# Patient Record
Sex: FEMALE | Race: WHITE | NOT HISPANIC OR LATINO | Employment: OTHER | URBAN - METROPOLITAN AREA
[De-identification: names, ages, dates, MRNs, and addresses within clinical notes are randomized per-mention and may not be internally consistent; named-entity substitution may affect disease eponyms.]

---

## 2017-06-14 ENCOUNTER — HOSPITAL ENCOUNTER (OUTPATIENT)
Dept: OTHER | Facility: HOSPITAL | Age: 72
Setting detail: SPECIMEN
Discharge: HOME | End: 2017-06-14
Attending: INTERNAL MEDICINE | Admitting: INTERNAL MEDICINE

## 2017-06-14 ENCOUNTER — OFFICE (AMBULATORY)
Dept: URBAN - METROPOLITAN AREA CLINIC 64 | Facility: CLINIC | Age: 72
End: 2017-06-14
Payer: OTHER GOVERNMENT

## 2017-06-14 ENCOUNTER — ON CAMPUS - OUTPATIENT (AMBULATORY)
Dept: URBAN - METROPOLITAN AREA HOSPITAL 2 | Facility: HOSPITAL | Age: 72
End: 2017-06-14
Payer: OTHER GOVERNMENT

## 2017-06-14 VITALS
HEIGHT: 65 IN | TEMPERATURE: 98 F | HEART RATE: 61 BPM | DIASTOLIC BLOOD PRESSURE: 68 MMHG | DIASTOLIC BLOOD PRESSURE: 47 MMHG | HEART RATE: 69 BPM | SYSTOLIC BLOOD PRESSURE: 96 MMHG | RESPIRATION RATE: 18 BRPM | RESPIRATION RATE: 16 BRPM | DIASTOLIC BLOOD PRESSURE: 44 MMHG | DIASTOLIC BLOOD PRESSURE: 53 MMHG | WEIGHT: 145.2 LBS | SYSTOLIC BLOOD PRESSURE: 103 MMHG | OXYGEN SATURATION: 96 % | HEART RATE: 66 BPM | SYSTOLIC BLOOD PRESSURE: 92 MMHG | OXYGEN SATURATION: 99 % | SYSTOLIC BLOOD PRESSURE: 113 MMHG | OXYGEN SATURATION: 98 % | SYSTOLIC BLOOD PRESSURE: 128 MMHG | HEART RATE: 68 BPM | DIASTOLIC BLOOD PRESSURE: 60 MMHG | DIASTOLIC BLOOD PRESSURE: 71 MMHG | OXYGEN SATURATION: 93 % | SYSTOLIC BLOOD PRESSURE: 91 MMHG | HEART RATE: 62 BPM | SYSTOLIC BLOOD PRESSURE: 102 MMHG | HEART RATE: 64 BPM | OXYGEN SATURATION: 97 % | DIASTOLIC BLOOD PRESSURE: 66 MMHG | SYSTOLIC BLOOD PRESSURE: 95 MMHG | DIASTOLIC BLOOD PRESSURE: 54 MMHG | DIASTOLIC BLOOD PRESSURE: 57 MMHG | HEART RATE: 67 BPM | SYSTOLIC BLOOD PRESSURE: 94 MMHG | OXYGEN SATURATION: 95 %

## 2017-06-14 DIAGNOSIS — K62.1 RECTAL POLYP: ICD-10-CM

## 2017-06-14 DIAGNOSIS — D12.3 BENIGN NEOPLASM OF TRANSVERSE COLON: ICD-10-CM

## 2017-06-14 DIAGNOSIS — D12.0 BENIGN NEOPLASM OF CECUM: ICD-10-CM

## 2017-06-14 DIAGNOSIS — K63.89 OTHER SPECIFIED DISEASES OF INTESTINE: ICD-10-CM

## 2017-06-14 DIAGNOSIS — D12.8 BENIGN NEOPLASM OF RECTUM: ICD-10-CM

## 2017-06-14 DIAGNOSIS — Z86.010 PERSONAL HISTORY OF COLONIC POLYPS: ICD-10-CM

## 2017-06-14 LAB
GI HISTOLOGY: A. UNSPECIFIED: (no result)
GI HISTOLOGY: B. UNSPECIFIED: (no result)
GI HISTOLOGY: C. UNSPECIFIED: (no result)
GI HISTOLOGY: PDF REPORT: (no result)

## 2017-06-14 PROCEDURE — 88305 TISSUE EXAM BY PATHOLOGIST: CPT | Mod: 26 | Performed by: INTERNAL MEDICINE

## 2017-06-14 PROCEDURE — 45385 COLONOSCOPY W/LESION REMOVAL: CPT | Mod: PT | Performed by: INTERNAL MEDICINE

## 2017-06-14 RX ADMIN — PROPOFOL: 10 INJECTION, EMULSION INTRAVENOUS at 11:08

## 2020-11-12 ENCOUNTER — OFFICE (AMBULATORY)
Dept: URBAN - METROPOLITAN AREA CLINIC 64 | Facility: CLINIC | Age: 75
End: 2020-11-12
Payer: OTHER GOVERNMENT

## 2020-11-12 VITALS
HEIGHT: 65 IN | WEIGHT: 151 LBS | HEART RATE: 75 BPM | DIASTOLIC BLOOD PRESSURE: 80 MMHG | SYSTOLIC BLOOD PRESSURE: 143 MMHG

## 2020-11-12 DIAGNOSIS — R15.9 FULL INCONTINENCE OF FECES: ICD-10-CM

## 2020-11-12 DIAGNOSIS — K62.5 HEMORRHAGE OF ANUS AND RECTUM: ICD-10-CM

## 2020-11-12 PROCEDURE — 99203 OFFICE O/P NEW LOW 30 MIN: CPT | Performed by: INTERNAL MEDICINE

## 2021-05-03 ENCOUNTER — OFFICE (AMBULATORY)
Dept: URBAN - METROPOLITAN AREA CLINIC 64 | Facility: CLINIC | Age: 76
End: 2021-05-03
Payer: OTHER GOVERNMENT

## 2021-05-03 VITALS
HEIGHT: 65 IN | HEART RATE: 75 BPM | WEIGHT: 151 LBS | DIASTOLIC BLOOD PRESSURE: 72 MMHG | SYSTOLIC BLOOD PRESSURE: 127 MMHG

## 2021-05-03 DIAGNOSIS — K21.9 GASTRO-ESOPHAGEAL REFLUX DISEASE WITHOUT ESOPHAGITIS: ICD-10-CM

## 2021-05-03 DIAGNOSIS — R15.9 FULL INCONTINENCE OF FECES: ICD-10-CM

## 2021-05-03 DIAGNOSIS — K62.5 HEMORRHAGE OF ANUS AND RECTUM: ICD-10-CM

## 2021-05-03 PROCEDURE — 99213 OFFICE O/P EST LOW 20 MIN: CPT | Performed by: INTERNAL MEDICINE

## 2021-09-27 ENCOUNTER — OFFICE VISIT (OUTPATIENT)
Dept: PODIATRY | Facility: CLINIC | Age: 76
End: 2021-09-27

## 2021-09-27 VITALS
DIASTOLIC BLOOD PRESSURE: 80 MMHG | BODY MASS INDEX: 24.99 KG/M2 | HEIGHT: 65 IN | WEIGHT: 150 LBS | SYSTOLIC BLOOD PRESSURE: 149 MMHG | HEART RATE: 78 BPM

## 2021-09-27 DIAGNOSIS — G89.29 CHRONIC PAIN OF LEFT ANKLE: Primary | ICD-10-CM

## 2021-09-27 DIAGNOSIS — M25.572 CHRONIC PAIN OF LEFT ANKLE: Primary | ICD-10-CM

## 2021-09-27 DIAGNOSIS — M76.62 ACHILLES TENDINITIS, LEFT LEG: ICD-10-CM

## 2021-09-27 PROCEDURE — 99203 OFFICE O/P NEW LOW 30 MIN: CPT | Performed by: PODIATRIST

## 2021-09-28 NOTE — PROGRESS NOTES
09/27/2021  Foot and Ankle Surgery - New Patient   Provider: Dr. Rambo Arriola DPM  Location: [facility]    Subjective:  Donna Bishop is a 76 y.o. female.     Chief Complaint   Patient presents with   • Left Ankle - Pain   • Initial Evaluation     last pcp appt 8/17/2021       HPI: Patient is a 76-year-old female that presents with issues involving her left lower extremity.  She has dealt with chronic pain involving the Achilles tendon over the last 5 months.  She states that symptoms started earlier this year where she noticed pain and swelling to the area.  Symptoms were quite significant after initial presentation but have improved.  She denies any prominent pain to the area on a daily basis but does have significant swelling.  She has been treated with Cam boot immobilization and physical therapy with no substantial improvement.  She remains in the boot and recently obtained an MRI and was referred to me for further management.  She denies any issues involving her right lower extremity.    Allergies   Allergen Reactions   • Decadron [Dexamethasone] Swelling   • Vesicare [Solifenacin] Itching   • Sulfa Antibiotics Rash       Past Medical History:   Diagnosis Date   • Hypertension    • Thyroid disease        Past Surgical History:   Procedure Laterality Date   • BLADDER REPAIR     • HYSTERECTOMY     • ORIF FINGER / THUMB FRACTURE     • PELVIC LAPAROSCOPY     • RECTAL SURGERY         Family History   Problem Relation Age of Onset   • Hypertension Mother    • Diabetes Father    • Heart disease Father    • Diabetes Sister    • Diabetes Brother    • No Known Problems Maternal Grandmother    • No Known Problems Maternal Grandfather    • No Known Problems Paternal Grandmother    • No Known Problems Paternal Grandfather        Social History     Socioeconomic History   • Marital status:      Spouse name: Not on file   • Number of children: Not on file   • Years of education: Not on file   • Highest  "education level: Not on file   Tobacco Use   • Smoking status: Never Smoker   • Smokeless tobacco: Never Used   Vaping Use   • Vaping Use: Never used   Substance and Sexual Activity   • Alcohol use: Never   • Drug use: Never   • Sexual activity: Defer        No current outpatient medications on file prior to visit.     No current facility-administered medications on file prior to visit.       Review of Systems:  General: Denies fever, chills, fatigue, and weakness.  Eyes: Denies vision loss, blurry vision, and excessive redness.  ENT: Denies hearing issues and difficulty swallowing.  Cardiovascular: Denies palpitations, chest pain, or syncopal episodes.  Respiratory: Denies shortness of breath, wheezing, and coughing.  GI: Denies abdominal pain, nausea, and vomiting.   : Denies frequency, hematuria, and urgency.  Musculoskeletal: + Left lower extremity pain  Derm: Denies rash, open wounds, or suspicious lesions.  Neuro: Denies headaches, numbness, loss of coordination, and tremors.  Psych: Denies anxiety and depression.  Endocrine: Denies temperature intolerance and changes in appetite.  Heme: Denies bleeding disorders or abnormal bruising.     Objective   /80   Pulse 78   Ht 165.1 cm (65\")   Wt 68 kg (150 lb)   BMI 24.96 kg/m²     Foot/Ankle Exam:       General:   Appearance: appears stated age and healthy    Orientation: AAOx3    Affect: appropriate    Gait: unimpaired      VASCULAR      Left Foot Vascularity   Normal vascular exam    Dorsalis pedis:  2+  Posterior tibial:  2+  Skin Temperature: warm    Edema Grading:  None  CFT:  < 3 seconds  Pedal Hair Growth:  Present      NEUROLOGIC     Left Foot Neurologic   Light touch sensation:  Normal  Hot/cold sensation: normal    Achilles reflex:  2+     MUSCULOSKELETAL      Left Foot Musculoskeletal   Ecchymosis:  None  Arch:  Normal     MUSCLE STRENGTH     Left Foot Muscle Strength   Normal strength    Foot dorsiflexion:  5  Foot plantar flexion:  5  Foot " inversion:  5  Foot eversion:  5     DERMATOLOGIC     Left Foot Dermatologic   Skin: skin intact       TESTS     Left Foot Tests   Anterior drawer: negative    Varus tilt: negative        Left Foot Additional Comments: Hypertrophy noted to the mid substance of the Achilles tendon.  No eliezer defect is present.  Mild to moderate discomfort with palpation of the hypertrophy.  Muscle strength is appropriate      Assessment/Plan   Diagnoses and all orders for this visit:    1. Chronic pain of left ankle (Primary)  -     XR Ankle 3+ View Left  -     Ambulatory Referral to Physical Therapy Evaluate and treat    2. Achilles tendinitis, left leg      Patient presents with issues involving her Achilles tendon.  She states that symptoms have been present for several months and she has been treated conservatively.  She is currently wearing a cam boot.  She is unaware of any substantial pain with daily activity but states that symptoms are noticed with increased activity.  Imaging was obtained and reviewed showing no significant issues involving the calcaneus.  Her MRI was also reviewed which does show significant degenerative changes involving the mid substance of the Achilles tendon as expected.  Findings are concerning for previous partial tear of the tendon.  I did review the imaging, diagnoses, and treatment options at length.  Her symptoms do appear to be relatively mild at this time.  I have asked that she discontinue the cam boot.  We did review appropriate shoes and activity.  I do feel that she needs to proceed with physical therapy for added strengthening and modification of the scar tissue.  We reviewed low impact exercises and to avoid high-impact and excessive activity.  We reviewed rice therapy and proper use of OTC anti-inflammatories if necessary.  Patient is to return in 6 weeks for reevaluation.  Greater than 30 minutes was spent before, during, and after evaluation for patient care    Orders Placed This  Encounter   Procedures   • XR Ankle 3+ View Left     Scheduling Instructions:      Room 13      wb     Order Specific Question:   Reason for Exam:     Answer:   left achilles tendon pain     Order Specific Question:   Release to patient     Answer:   Immediate   • Ambulatory Referral to Physical Therapy Evaluate and treat     Referral Priority:   Routine     Referral Type:   Physical Therapy     Referral Reason:   Specialty Services Required     Referral Location:   [facility]     Requested Specialty:   Physical Therapy     Number of Visits Requested:   1        Note is dictated utilizing voice recognition software. Unfortunately this leads to occasional typographical errors. I apologize in advance if the situation occurs. If questions occur please do not hesitate to call our office.

## 2021-09-28 NOTE — PATIENT INSTRUCTIONS
"[employer]'s [employer]: Concepts and Clinical Practice (9th ed., pp. 2720-2750). [place], [place]: [employer] Retrieved from [email or web address]">   Achilles Tendinitis    Achilles tendinitis is inflammation of the tough, cord-like band that attaches the lower leg muscles to the heel bone (Achilles tendon). This is usually caused by overusing the tendon and the ankle joint.  Achilles tendinitis usually gets better over time with treatment and caring for yourself at home. It can take weeks or months to heal completely.  What are the causes?  This condition may be caused by:  · A sudden increase in exercise or activity, such as running.  · Doing the same exercises or activities, such as jumping, over and over.  · Not warming up calf muscles before exercising.  · Exercising in shoes that are worn out or not made for exercise.  · Having arthritis or a bone growth (spur) on the back of the heel bone. This can rub against the tendon and hurt it.  · Age-related wear and tear. Tendons become less flexible with age and are more likely to be injured.  What are the signs or symptoms?  Common symptoms of this condition include:  · Pain in the Achilles tendon or in the back of the leg, just above the heel. The pain usually gets worse with exercise.  · Stiffness or soreness in the back of the leg, especially in the morning.  · Swelling of the skin over the Achilles tendon.  · Thickening of the tendon.  · Trouble standing on tiptoe.  How is this diagnosed?  This condition is diagnosed based on your symptoms and a physical exam. You may have tests, including:  · X-rays.  · MRI.  How is this treated?  The goal of treatment is to relieve symptoms and help your injury heal. Treatment may include:  · Decreasing or stopping activities that caused the tendinitis. This may mean switching to low-impact exercises like biking or swimming.  · Icing the injured " area.  · Doing physical therapy, including strengthening and stretching exercises.  · Taking NSAIDs, such as ibuprofen, to help relieve pain and swelling.  · Using supportive shoes, wraps, heel lifts, or a walking boot (air cast).  · Having surgery. This may be done if your symptoms do not improve after other treatments.  · Using high-energy shock wave impulses to stimulate the healing process (extracorporeal shock wave therapy). This is rare.  · Having an injection of medicines that help relieve inflammation (corticosteroids). This is rare.  Follow these instructions at home:  If you have an air cast:  · Wear the air cast as told by your health care provider. Remove it only as told by your health care provider.  · Loosen it if your toes tingle, become numb, or turn cold and blue.  · Keep it clean.  · If the air cast is not waterproof:  ? Do not let it get wet.  ? Cover it with a watertight covering when you take a bath or shower.  Managing pain, stiffness, and swelling    · If directed, put ice on the injured area. To do this:  ? If you have a removable air cast, remove it as told by your health care provider.  ? Put ice in a plastic bag.  ? Place a towel between your skin and the bag.  ? Leave the ice on for 20 minutes, 2-3 times a day.  · Move your toes often to reduce stiffness and swelling.  · Raise (elevate) your foot above the level of your heart while you are sitting or lying down.    Activity  · Gradually return to your normal activities as told by your health care provider. Ask your health care provider what activities are safe for you.  · Do not do activities that cause pain.  · Consider doing low-impact exercises, like cycling or swimming.  · Ask your health care provider when it is safe to drive if you have an air cast on your foot.  · If physical therapy was prescribed, do exercises as told by your health care provider or physical therapist.  General instructions  · If directed, wrap your foot with an  elastic bandage or other wrap. This can help to keep your tendon from moving too much while it heals. Your health care provider will show you how to wrap your foot correctly.  · Wear supportive shoes or heel lifts only as told by your health care provider.  · Take over-the-counter and prescription medicines only as told by your health care provider.  · Keep all follow-up visits as told by your health care provider. This is important.  Contact a health care provider if you:  · Have symptoms that get worse.  · Have pain that does not get better with medicine.  · Develop new, unexplained symptoms.  · Develop warmth and swelling in your foot.  · Have a fever.  Get help right away if you:  · Have a sudden popping sound or sensation in your Achilles tendon followed by severe pain.  · Cannot move your toes or foot.  · Cannot put any weight on your foot.  · Your foot or toes become numb and look white or blue even after loosening your bandage or air cast.  Summary  · Achilles tendinitis is inflammation of the tough, cord-like band that attaches the lower leg muscles to the heel bone (Achilles tendon).  · This condition is usually caused by overusing the tendon and the ankle joint. It can also be caused by arthritis or normal aging.  · The most common symptoms of this condition include pain, swelling, or stiffness in the Achilles tendon or in the back of the leg.  · This condition is usually treated by decreasing or stopping activities that caused the tendinitis, icing the injured area, taking NSAIDs, and doing physical therapy.  This information is not intended to replace advice given to you by your health care provider. Make sure you discuss any questions you have with your health care provider.  Document Revised: 05/04/2020 Document Reviewed: 05/04/2020  Elsevier Patient Education © 2021 [employer]

## 2021-11-08 ENCOUNTER — OFFICE VISIT (OUTPATIENT)
Dept: PODIATRY | Facility: CLINIC | Age: 76
End: 2021-11-08

## 2021-11-08 VITALS
BODY MASS INDEX: 24.99 KG/M2 | SYSTOLIC BLOOD PRESSURE: 142 MMHG | DIASTOLIC BLOOD PRESSURE: 80 MMHG | HEART RATE: 83 BPM | WEIGHT: 150 LBS | HEIGHT: 65 IN

## 2021-11-08 DIAGNOSIS — G89.29 CHRONIC PAIN OF LEFT ANKLE: Primary | ICD-10-CM

## 2021-11-08 DIAGNOSIS — M76.62 ACHILLES TENDINITIS, LEFT LEG: ICD-10-CM

## 2021-11-08 DIAGNOSIS — M25.572 CHRONIC PAIN OF LEFT ANKLE: Primary | ICD-10-CM

## 2021-11-08 PROCEDURE — 99212 OFFICE O/P EST SF 10 MIN: CPT | Performed by: PODIATRIST

## 2021-11-08 RX ORDER — LOSARTAN POTASSIUM 25 MG/1
25 TABLET ORAL DAILY
COMMUNITY

## 2021-11-08 RX ORDER — LEVOTHYROXINE AND LIOTHYRONINE 57; 13.5 UG/1; UG/1
90 TABLET ORAL DAILY
COMMUNITY

## 2021-11-08 NOTE — PROGRESS NOTES
"11/08/2021  Foot and Ankle Surgery - Established Patient/Follow-up  Provider: DANIELLE Mix   Location: [facility]    Subjective:  Donna Bishop is a 76 y.o. female.     Chief Complaint   Patient presents with   • Left Ankle - Pain   • Follow-up     last pcp appt 8/1/2021       HPI: Patient presents to the office today for follow-up of left Achilles tendinitis.  Patient reports that she is feeling 99% better.    Allergies   Allergen Reactions   • Decadron [Dexamethasone] Swelling   • Vesicare [Solifenacin] Itching   • Sulfa Antibiotics Rash       Current Outpatient Medications on File Prior to Visit   Medication Sig Dispense Refill   • losartan (COZAAR) 25 MG tablet Take 25 mg by mouth Daily.     • Thyroid 90 MG PO tablet Take 90 mg by mouth Daily.       No current facility-administered medications on file prior to visit.       Objective   /80   Pulse 83   Ht 165.1 cm (65\")   Wt 68 kg (150 lb)   BMI 24.96 kg/m²     Foot/Ankle Exam       General:   Appearance: appears stated age and healthy    Orientation: AAOx3    Affect: appropriate    Gait: unimpaired       VASCULAR       Left Foot Vascularity   Normal vascular exam    Dorsalis pedis:  2+  Posterior tibial:  2+  Skin Temperature: warm    Edema Grading:  None  CFT:  < 3 seconds  Pedal Hair Growth:  Present      NEUROLOGIC      Left Foot Neurologic   Light touch sensation:  Normal  Hot/cold sensation: normal    Achilles reflex:  2+      MUSCULOSKELETAL       Left Foot Musculoskeletal   Ecchymosis:  None  Arch:  Normal      MUSCLE STRENGTH      Left Foot Muscle Strength   Normal strength    Foot dorsiflexion:  5  Foot plantar flexion:  5  Foot inversion:  5  Foot eversion:  5      DERMATOLOGIC      Left Foot Dermatologic   Skin: skin intact        TESTS      Left Foot Tests   Anterior drawer: negative    Varus tilt: negative        Left Foot Additional Comments: Hypertrophy noted to the mid substance of the Achilles tendon.  No eliezer " defect is present.   Muscle strength is appropriate       Assessment/Plan   Diagnoses and all orders for this visit:    1. Chronic pain of left ankle (Primary)    2. Achilles tendinitis, left leg      On exam patient continues to have hypertrophy noted to the left Achilles tendon area she continues to improve and her function and activity.  Okay with seeing her on an as-needed basis if any problems come up or get worse.  Today we discussed returning back to activity slowly.  Would recommend returning to walking on flat terrain, short distances, and in moderation.  We discussed the risk of reinjuring or causing a more significant injury in the future.  Patient verbalized understanding and is agreeable to plan regarding follow-up as needed.    No orders of the defined types were placed in this encounter.         Note is dictated utilizing voice recognition software. Unfortunately this leads to occasional typographical errors. I apologize in advance if the situation occurs. If questions occur please do not hesitate to call our office.    The scribe is a “living recorder” working shoulder to shoulder with the billing provider transcribing the physicians own words. The scribe does not provide hands on care to the patient. The scribe needs to add an attestation at the bottom of the note stating “written by (Lauren Buenrostro), acting as a scribe for Dr. Arriola .  Dr. Arriola's  signature on the note affirms that the note adequately documents the care provided.

## 2021-12-08 ENCOUNTER — TELEPHONE (OUTPATIENT)
Dept: PODIATRY | Facility: CLINIC | Age: 76
End: 2021-12-08

## 2021-12-08 NOTE — TELEPHONE ENCOUNTER
Caller: Donna Bishop    Relationship: Self    Best call back number: [phone]  What form or medical record are you requesting: MEDICAL RECORDS & IMAGES REPORTS WITH JUSTUS POE     Who is requesting this form or medical record from you:  PRIMARY CARE DR. JOANN NAIDU     How would you like to receive the form or medical records (pick-up, mail, fax): MAIL  If fax, what is the fax number:   If mail, what is the address: [address] [place] [address]  If pick-up, provide patient with address and location details    Timeframe paperwork needed: ASAP    Additional notes:  PATIENT ADVISED SHE WOULD LIKE TO FOR HER MEDICAL RECORDS AND IMAGES REPORTS TO BE MAILED TO HER PRIMARY CARE DOCTOR JOANN NAIDU ASAP.

## 2022-06-14 ENCOUNTER — OFFICE (AMBULATORY)
Dept: URBAN - METROPOLITAN AREA PATHOLOGY 4 | Facility: PATHOLOGY | Age: 77
End: 2022-06-14
Payer: MEDICARE

## 2022-06-14 ENCOUNTER — OFFICE (AMBULATORY)
Dept: URBAN - METROPOLITAN AREA PATHOLOGY 4 | Facility: PATHOLOGY | Age: 77
End: 2022-06-14
Payer: OTHER GOVERNMENT

## 2022-06-14 ENCOUNTER — ON CAMPUS - OUTPATIENT (AMBULATORY)
Dept: URBAN - METROPOLITAN AREA HOSPITAL 2 | Facility: HOSPITAL | Age: 77
End: 2022-06-14
Payer: OTHER GOVERNMENT

## 2022-06-14 VITALS
HEIGHT: 65 IN | SYSTOLIC BLOOD PRESSURE: 110 MMHG | HEART RATE: 64 BPM | SYSTOLIC BLOOD PRESSURE: 107 MMHG | DIASTOLIC BLOOD PRESSURE: 63 MMHG | HEART RATE: 72 BPM | DIASTOLIC BLOOD PRESSURE: 65 MMHG | SYSTOLIC BLOOD PRESSURE: 101 MMHG | DIASTOLIC BLOOD PRESSURE: 72 MMHG | DIASTOLIC BLOOD PRESSURE: 79 MMHG | WEIGHT: 148 LBS | OXYGEN SATURATION: 98 % | SYSTOLIC BLOOD PRESSURE: 130 MMHG | SYSTOLIC BLOOD PRESSURE: 106 MMHG | OXYGEN SATURATION: 99 % | HEART RATE: 62 BPM | DIASTOLIC BLOOD PRESSURE: 75 MMHG | OXYGEN SATURATION: 96 % | HEART RATE: 66 BPM | HEART RATE: 65 BPM | RESPIRATION RATE: 14 BRPM | OXYGEN SATURATION: 97 % | RESPIRATION RATE: 16 BRPM | SYSTOLIC BLOOD PRESSURE: 109 MMHG | RESPIRATION RATE: 18 BRPM | TEMPERATURE: 97.5 F | SYSTOLIC BLOOD PRESSURE: 112 MMHG | DIASTOLIC BLOOD PRESSURE: 55 MMHG | HEART RATE: 63 BPM | DIASTOLIC BLOOD PRESSURE: 78 MMHG | HEART RATE: 61 BPM

## 2022-06-14 DIAGNOSIS — D12.0 BENIGN NEOPLASM OF CECUM: ICD-10-CM

## 2022-06-14 DIAGNOSIS — D12.2 BENIGN NEOPLASM OF ASCENDING COLON: ICD-10-CM

## 2022-06-14 DIAGNOSIS — K62.1 RECTAL POLYP: ICD-10-CM

## 2022-06-14 DIAGNOSIS — Z86.010 PERSONAL HISTORY OF COLONIC POLYPS: ICD-10-CM

## 2022-06-14 PROBLEM — K63.5 POLYP OF COLON: Status: ACTIVE | Noted: 2022-06-14

## 2022-06-14 LAB
GI HISTOLOGY: A. SELECT: (no result)
GI HISTOLOGY: B. UNSPECIFIED: (no result)
GI HISTOLOGY: PDF REPORT: (no result)

## 2022-06-14 PROCEDURE — 45385 COLONOSCOPY W/LESION REMOVAL: CPT | Mod: PT | Performed by: INTERNAL MEDICINE

## 2022-06-14 PROCEDURE — 88305 TISSUE EXAM BY PATHOLOGIST: CPT | Mod: 26 | Performed by: INTERNAL MEDICINE
